# Patient Record
Sex: FEMALE | Race: WHITE | NOT HISPANIC OR LATINO | Employment: UNEMPLOYED | URBAN - METROPOLITAN AREA
[De-identification: names, ages, dates, MRNs, and addresses within clinical notes are randomized per-mention and may not be internally consistent; named-entity substitution may affect disease eponyms.]

---

## 2024-08-06 ENCOUNTER — TELEPHONE (OUTPATIENT)
Dept: BEHAVIORAL HEALTH | Facility: CLINIC | Age: 39
End: 2024-08-06

## 2024-08-06 ENCOUNTER — HOSPITAL ENCOUNTER (EMERGENCY)
Facility: OTHER | Age: 39
Discharge: PSYCHIATRIC OR SUBSTANCE USE FACILITY | End: 2024-08-07
Payer: COMMERCIAL

## 2024-08-06 DIAGNOSIS — F22 DELUSIONS (H): ICD-10-CM

## 2024-08-06 DIAGNOSIS — F41.9 ANXIETY: ICD-10-CM

## 2024-08-06 DIAGNOSIS — F22 PARANOID (H): ICD-10-CM

## 2024-08-06 DIAGNOSIS — R45.851 SUICIDAL IDEATION: ICD-10-CM

## 2024-08-06 PROBLEM — F25.1 SCHIZOAFFECTIVE DISORDER, DEPRESSIVE TYPE (H): Status: ACTIVE | Noted: 2024-08-06

## 2024-08-06 PROBLEM — F43.10 PTSD (POST-TRAUMATIC STRESS DISORDER): Status: ACTIVE | Noted: 2024-08-06

## 2024-08-06 PROBLEM — F41.1 GAD (GENERALIZED ANXIETY DISORDER): Status: ACTIVE | Noted: 2024-08-06

## 2024-08-06 LAB
ALBUMIN UR-MCNC: NEGATIVE MG/DL
AMPHETAMINES UR QL SCN: ABNORMAL
ANION GAP SERPL CALCULATED.3IONS-SCNC: 9 MMOL/L (ref 7–15)
APAP SERPL-MCNC: <5 UG/ML (ref 10–30)
APPEARANCE UR: CLEAR
BARBITURATES UR QL SCN: ABNORMAL
BASOPHILS # BLD AUTO: 0 10E3/UL (ref 0–0.2)
BASOPHILS NFR BLD AUTO: 0 %
BENZODIAZ UR QL SCN: ABNORMAL
BILIRUB UR QL STRIP: NEGATIVE
BUN SERPL-MCNC: 12 MG/DL (ref 6–20)
BZE UR QL SCN: ABNORMAL
CALCIUM SERPL-MCNC: 10.8 MG/DL (ref 8.8–10.4)
CANNABINOIDS UR QL SCN: ABNORMAL
CHLORIDE SERPL-SCNC: 107 MMOL/L (ref 98–107)
COLOR UR AUTO: YELLOW
CREAT SERPL-MCNC: 0.74 MG/DL (ref 0.51–0.95)
EGFRCR SERPLBLD CKD-EPI 2021: >90 ML/MIN/1.73M2
EOSINOPHIL # BLD AUTO: 0.1 10E3/UL (ref 0–0.7)
EOSINOPHIL NFR BLD AUTO: 1 %
ERYTHROCYTE [DISTWIDTH] IN BLOOD BY AUTOMATED COUNT: 12.3 % (ref 10–15)
ETHANOL SERPL-MCNC: <0.01 G/DL
FENTANYL UR QL: ABNORMAL
GLUCOSE SERPL-MCNC: 94 MG/DL (ref 70–99)
GLUCOSE UR STRIP-MCNC: NEGATIVE MG/DL
HCG UR QL: NEGATIVE
HCO3 SERPL-SCNC: 27 MMOL/L (ref 22–29)
HCT VFR BLD AUTO: 44.7 % (ref 35–47)
HGB BLD-MCNC: 14.9 G/DL (ref 11.7–15.7)
HGB UR QL STRIP: NEGATIVE
HOLD SPECIMEN: NORMAL
HOLD SPECIMEN: NORMAL
IMM GRANULOCYTES # BLD: 0 10E3/UL
IMM GRANULOCYTES NFR BLD: 0 %
KETONES UR STRIP-MCNC: NEGATIVE MG/DL
LEUKOCYTE ESTERASE UR QL STRIP: NEGATIVE
LYMPHOCYTES # BLD AUTO: 1.2 10E3/UL (ref 0.8–5.3)
LYMPHOCYTES NFR BLD AUTO: 13 %
MCH RBC QN AUTO: 30.2 PG (ref 26.5–33)
MCHC RBC AUTO-ENTMCNC: 33.3 G/DL (ref 31.5–36.5)
MCV RBC AUTO: 91 FL (ref 78–100)
MONOCYTES # BLD AUTO: 0.5 10E3/UL (ref 0–1.3)
MONOCYTES NFR BLD AUTO: 5 %
MUCOUS THREADS #/AREA URNS LPF: PRESENT /LPF
NEUTROPHILS # BLD AUTO: 7.4 10E3/UL (ref 1.6–8.3)
NEUTROPHILS NFR BLD AUTO: 81 %
NITRATE UR QL: NEGATIVE
NRBC # BLD AUTO: 0 10E3/UL
NRBC BLD AUTO-RTO: 0 /100
OPIATES UR QL SCN: ABNORMAL
PCP QUAL URINE (ROCHE): ABNORMAL
PH UR STRIP: 7.5 [PH] (ref 5–9)
PLATELET # BLD AUTO: 185 10E3/UL (ref 150–450)
POTASSIUM SERPL-SCNC: 4.3 MMOL/L (ref 3.4–5.3)
RBC # BLD AUTO: 4.94 10E6/UL (ref 3.8–5.2)
RBC URINE: 1 /HPF
SALICYLATES SERPL-MCNC: <0.3 MG/DL
SARS-COV-2 RNA RESP QL NAA+PROBE: NEGATIVE
SODIUM SERPL-SCNC: 143 MMOL/L (ref 135–145)
SP GR UR STRIP: 1.01 (ref 1–1.03)
TSH SERPL DL<=0.005 MIU/L-ACNC: 3.19 UIU/ML (ref 0.3–4.2)
UROBILINOGEN UR STRIP-MCNC: NORMAL MG/DL
WBC # BLD AUTO: 9.2 10E3/UL (ref 4–11)
WBC URINE: 1 /HPF

## 2024-08-06 PROCEDURE — 80143 DRUG ASSAY ACETAMINOPHEN: CPT

## 2024-08-06 PROCEDURE — 80179 DRUG ASSAY SALICYLATE: CPT

## 2024-08-06 PROCEDURE — 99285 EMERGENCY DEPT VISIT HI MDM: CPT

## 2024-08-06 PROCEDURE — 36415 COLL VENOUS BLD VENIPUNCTURE: CPT

## 2024-08-06 PROCEDURE — 80307 DRUG TEST PRSMV CHEM ANLYZR: CPT

## 2024-08-06 PROCEDURE — 82077 ASSAY SPEC XCP UR&BREATH IA: CPT

## 2024-08-06 PROCEDURE — 80048 BASIC METABOLIC PNL TOTAL CA: CPT

## 2024-08-06 PROCEDURE — 81025 URINE PREGNANCY TEST: CPT

## 2024-08-06 PROCEDURE — 85025 COMPLETE CBC W/AUTO DIFF WBC: CPT

## 2024-08-06 PROCEDURE — 81001 URINALYSIS AUTO W/SCOPE: CPT

## 2024-08-06 PROCEDURE — 250N000013 HC RX MED GY IP 250 OP 250 PS 637

## 2024-08-06 PROCEDURE — 87635 SARS-COV-2 COVID-19 AMP PRB: CPT

## 2024-08-06 PROCEDURE — 99244 OFF/OP CNSLTJ NEW/EST MOD 40: CPT

## 2024-08-06 PROCEDURE — 84443 ASSAY THYROID STIM HORMONE: CPT

## 2024-08-06 RX ORDER — ACETAMINOPHEN 500 MG
1000 TABLET ORAL ONCE
Status: COMPLETED | OUTPATIENT
Start: 2024-08-06 | End: 2024-08-06

## 2024-08-06 RX ORDER — IBUPROFEN 400 MG/1
800 TABLET, FILM COATED ORAL ONCE
Status: COMPLETED | OUTPATIENT
Start: 2024-08-06 | End: 2024-08-06

## 2024-08-06 RX ORDER — BUSPIRONE HYDROCHLORIDE 10 MG/1
10 TABLET ORAL DAILY
COMMUNITY

## 2024-08-06 RX ADMIN — ACETAMINOPHEN 1000 MG: 500 TABLET, FILM COATED ORAL at 19:31

## 2024-08-06 RX ADMIN — IBUPROFEN 800 MG: 400 TABLET, FILM COATED ORAL at 15:33

## 2024-08-06 ASSESSMENT — ACTIVITIES OF DAILY LIVING (ADL)
ADLS_ACUITY_SCORE: 35

## 2024-08-06 ASSESSMENT — ENCOUNTER SYMPTOMS: NERVOUS/ANXIOUS: 1

## 2024-08-06 ASSESSMENT — COLUMBIA-SUICIDE SEVERITY RATING SCALE - C-SSRS
3. HAVE YOU BEEN THINKING ABOUT HOW YOU MIGHT KILL YOURSELF?: NO
5. HAVE YOU STARTED TO WORK OUT OR WORKED OUT THE DETAILS OF HOW TO KILL YOURSELF? DO YOU INTEND TO CARRY OUT THIS PLAN?: NO
2. HAVE YOU ACTUALLY HAD ANY THOUGHTS OF KILLING YOURSELF IN THE PAST MONTH?: YES
1. IN THE PAST MONTH, HAVE YOU WISHED YOU WERE DEAD OR WISHED YOU COULD GO TO SLEEP AND NOT WAKE UP?: YES
4. HAVE YOU HAD THESE THOUGHTS AND HAD SOME INTENTION OF ACTING ON THEM?: YES
6. HAVE YOU EVER DONE ANYTHING, STARTED TO DO ANYTHING, OR PREPARED TO DO ANYTHING TO END YOUR LIFE?: NO

## 2024-08-06 NOTE — ED NOTES
Placed call to the Crisis Response Team (CRT) at [phone]. Spoke with Mitchell and provided clinical information. CRT will follow up upon pt discharging to the community by phone or in person, as appropriate. Faxed assessment to CRT at [phone].
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
## 2024-08-06 NOTE — ED NOTES
IP MH Referral Acuity Rating Score (RARS)    LMHP complete at referral to IP MH, with DEC; and, daily while awaiting IP MH placement. Call score to PPS.  CRITERIA SCORING   New 72 HH and Involuntary for IP MH (not adolescent) 1/1   Boarding over 24 hours 0/1   Vulnerable adult at least 55+ with multiple co morbidities; or, Patient age 11 or under 0/1   Suicide ideation without relief of precipitating factors 1/1   Current plan for suicide 0/1   Current plan for homicide 0/1   Imminent risk or actual attempt to seriously harm another without relief of factors precipitating the attempt 0/1   Severe dysfunction in daily living (ex: complete neglect for self care, extreme disruption in vegetative function, extreme deterioration in social interactions) 1/1   Recent (last 2 weeks) or current physical aggression in the ED 0/1   Restraints or seclusion episode in ED 0/1   Verbal aggression, agitation, yelling, etc., while in the ED 0/1   Active psychosis with psychomotor agitation or catatonia 0/1   Need for constant or near constant redirection (from leaving, from others, etc).  0/1   Intrusive or disruptive behaviors 1/1   TOTAL Acuity Total Score: 4
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
## 2024-08-06 NOTE — PHARMACY-ADMISSION MEDICATION HISTORY
Pharmacist Admission Medication History    Admission medication history is complete. The information provided in this note is only as accurate as the sources available at the time of the update.    Information Source(s): Patient and [facility]  via in-person and phone    Pertinent Information: Patient fairly knowledgeable about her medication. Patient stated that she is taking only two prescription medication. Patient was able to give strength, frequency, last dose for Prozac, but was unable to given the strength for her Buspar. [facility] was called and strength was verified.   Patient stated that she started these medications only a week ago. Per Casandraeens, this seems to match up: Medication were picked up on 7/25/24.   Patient stated that she is not taking any additional OTC, herbal supplements, or vitamins.    Changes made to PTA medication list:  Added:   FLUoxetine   busPIRone   Deleted: None  Changed: None    Allergies reviewed with patient and updates made in EHR: yes    Medication History Completed By: Joe Harmon RPH 8/6/2024 3:49 PM    PTA Med List   Medication Sig Last Dose    busPIRone (BUSPAR) 10 MG tablet Take 10 mg by mouth daily 8/5/2024 at Afternoon    FLUoxetine (PROZAC) 20 MG capsule Take 20 mg by mouth daily 8/5/2024 at Afternoon
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
## 2024-08-07 ENCOUNTER — TELEPHONE (OUTPATIENT)
Dept: BEHAVIORAL HEALTH | Facility: CLINIC | Age: 39
End: 2024-08-07
Payer: COMMERCIAL

## 2024-08-07 VITALS
TEMPERATURE: 98 F | WEIGHT: 174 LBS | DIASTOLIC BLOOD PRESSURE: 89 MMHG | HEART RATE: 50 BPM | OXYGEN SATURATION: 97 % | RESPIRATION RATE: 16 BRPM | BODY MASS INDEX: 28.99 KG/M2 | SYSTOLIC BLOOD PRESSURE: 129 MMHG | HEIGHT: 65 IN

## 2024-08-07 PROCEDURE — 250N000013 HC RX MED GY IP 250 OP 250 PS 637: Performed by: STUDENT IN AN ORGANIZED HEALTH CARE EDUCATION/TRAINING PROGRAM

## 2024-08-07 RX ORDER — BUSPIRONE HYDROCHLORIDE 10 MG/1
10 TABLET ORAL DAILY
Status: DISCONTINUED | OUTPATIENT
Start: 2024-08-07 | End: 2024-08-07 | Stop reason: HOSPADM

## 2024-08-07 RX ADMIN — BUSPIRONE HYDROCHLORIDE 10 MG: 10 TABLET ORAL at 09:06

## 2024-08-07 RX ADMIN — FLUOXETINE HYDROCHLORIDE 20 MG: 20 CAPSULE ORAL at 09:06

## 2024-08-07 ASSESSMENT — ACTIVITIES OF DAILY LIVING (ADL)
ADLS_ACUITY_SCORE: 35

## 2024-08-07 NOTE — ED PROVIDER NOTES
Transfer of care from previous shift provider:. SI with psychosis. Transferring to [facility] at noon today.    Assessment and Plan:  Final diagnoses:   Paranoid (H)   Suicidal ideation   Anxiety   Delusions (H)      Disposition: [facility] behavioral health      Felix Davis MD  08/07/24 2030

## 2024-08-07 NOTE — CONSULTS
"[facility] AND [facility] PSYCHIATRY   CONSULT     ASSESSMENT & PLAN     This is a 39 year old female with a PMH of schizoaffective disorder, PTSD, substance use disorder seen for psychiatric consultation. Placed on 72-hour hold on 8/6/2024 and recommended for inpatient hospitalization r/t acute psychosis. Prior to face-to-face consult, patient accepted for placement under Dr. Coleman at [facility]. No further consult necessary.     Working Diagnoses:    Psychotic disorder, unspecified    PTA medications:    Fluoxetine 20 mg (patient reported)  BuSpar 10 mg (patient reported)  Pristiq 50 mg ??    New medications initiated:     NA    Therapeutic Modalities: arts/crafts, interaction with pets, meditation, music, social media/computer activities, television/movies/videos, exercise/fitness, reading, writing/journaling/blogging. Education will be provided on diagnoses, medications, and treatments.     Medical diagnoses:  Per medicine.    Labs: Per medicine.        HISTORY OF PRESENT ILLNESS     Brenda Adams is a 39 year old female reviewed for consultation at the request of CURTIS Hoffman, for psychiatric consultation. The patient was reviewed for evaluation of paranoia, anxiety.  Sources of information include DEC assessment, chart review.    Location: [facility]  History of present illness/hospital course:     Per DEC:  \"Pt is a 39 year old  female with history of schizoaffective disorder, bipolar type, PTSD, anxiety and NIRAJ. Pt was brought to the ER today by EMS due to worsening of paranoia, delusion, anxiety and suicidal ideations. Pt remarked, \"Apparently, I have mental health issues, I have anxiety and depression.\" as her reason for visiting the ER today. Pt shared onset of suicidal ideations when she was 10 years old and her mental health symptoms got worse when her mom passed away in 2021. Pt said she did not know exactly who called the police on her but she thought her neighbors called the " "police and the police called EMS. Pt stated, \"I woke up this morning, then something told me to check out in the hallway and I heard my neighbors talking about me as they were trying to get me into trouble to get me evicted from my apartment.\" Pt said she can hear what other people were thinking but denied having commanding auditory hallucination. Pt also denied having visual hallucination. Pt endorsed paranoia as she felt her sister and neighbors were watching her, following her and ought to harm her. Pt endorsed delusional thinking as she believed her sister has been calling her neighbors around to tell them to harrass her by going in and out of her apartment to move her things around. Pt reported her neighbors were calling her names, \"Cuckoo and Wacko.\" Pt also alleged that her neighbors were putting dog poop on her apartment door to harrass her. Pt noted her sister has been stalking her, monitoring her phone, harrassing her through the 3rd party and she worked with law enforcement. Pt said her sister harrassed her in the past when she lived in [place] as this was not the first time. Pt endorsed increased depression, cry, worry, isolation, racing thoughts and anxiety. Pt reported she mostly worried about her dogs, other neighbors and people. Pt reported having poor sleep with less appetite. Pt currently denied having suicidal ideations but reported having thoughts of suicide earlier today without specific plan. Pt denied having homicidal ideations and access to firearms. Pt denied history of SIB, however Pt reported previous suicide attempts in 2016 and 2013.\"     Current Psychiatric Medications:   Current Outpatient Medications (Includes Only Antianxiety agents, Antidepressants, Antipsychotics, Misc. psychotherapeutic)   Medication Sig    FLUoxetine (PROZAC) 20 MG capsule Take 20 mg by mouth daily    busPIRone (BUSPAR) 10 MG tablet Take 10 mg by mouth daily       Current Oupatient Psychiatrist: [facility]    Current Outpatient Therapist: None    Past medications:  Effexor  Pristiq  Risperidone (2022)  Lexapro  Hydroxyzine  Xanax  Quetiapine (2022)  Ativan - reported as \"making me psychotic\"  Sertraline       MENTAL STATUS EXAM/PSYCHIATRIC ASSESSMENT     Deferred - patient accepted to [facility] under Dr. Coleman prior to face-to-face assessment.        PSYCHIATRIC HISTORY/ROS     Refer to DEC assessment for more thorough psychiatric history.       PAST MEDICAL HISTORY     Past Medical History:    Schizoaffective disorder, depressive type (HCC)  Type 2 diabetes mellitus without complication, without long-term current use of insulin (HCC)   Tobacco use disorder   Mixed hyperlipidemia   Methamphetamine use disorder, severe, in sustained remission (HCC)   Endometrial cancer (HCC)   Involuntary movements   Need for prophylactic vaccination against Streptococcus pneumoniae (pneumococcus)   Chronic midline low back pain without sciatica   Other acute pulmonary embolism without acute cor pulmonale (HCC)     Surgical History:     Hysterectomy, pilonidal cyst removal    Family History:  Positive for completed suicide (2 brothers).    Social History:  Mom passed in September 2021, history of homelessness. No previous  involvement. Previous legal issues including DUI, DWI - currently on probation for felony property damage. Has completed multiple CD treatments.    Allergies: No Known Allergies       PHYSICAL EXAM/ROS     Most recent physical exam as documented in the medical record has been reviewed.     Pertinent Labs:  Recent Results (from the past 24 hour(s))   Basic metabolic panel    Collection Time: 08/06/24  3:10 PM   Result Value Ref Range    Sodium 143 135 - 145 mmol/L    Potassium 4.3 3.4 - 5.3 mmol/L    Chloride 107 98 - 107 mmol/L    Carbon Dioxide (CO2) 27 22 - 29 mmol/L    Anion Gap 9 7 - 15 mmol/L    Urea Nitrogen 12.0 6.0 - 20.0 mg/dL    Creatinine 0.74 0.51 - 0.95 mg/dL    GFR " Estimate >90 >60 mL/min/1.73m2    Calcium 10.8 (H) 8.8 - 10.4 mg/dL    Glucose 94 70 - 99 mg/dL   TSH Reflex GH    Collection Time: 08/06/24  3:10 PM   Result Value Ref Range    TSH 3.19 0.30 - 4.20 uIU/mL   Salicylate level    Collection Time: 08/06/24  3:10 PM   Result Value Ref Range    Salicylate <0.3   mg/dL   Acetaminophen GH    Collection Time: 08/06/24  3:10 PM   Result Value Ref Range    Acetaminophen <5.0 (L) 10.0 - 30.0 ug/mL   CBC with platelets and differential    Collection Time: 08/06/24  3:10 PM   Result Value Ref Range    WBC Count 9.2 4.0 - 11.0 10e3/uL    RBC Count 4.94 3.80 - 5.20 10e6/uL    Hemoglobin 14.9 11.7 - 15.7 g/dL    Hematocrit 44.7 35.0 - 47.0 %    MCV 91 78 - 100 fL    MCH 30.2 26.5 - 33.0 pg    MCHC 33.3 31.5 - 36.5 g/dL    RDW 12.3 10.0 - 15.0 %    Platelet Count 185 150 - 450 10e3/uL    % Neutrophils 81 %    % Lymphocytes 13 %    % Monocytes 5 %    % Eosinophils 1 %    % Basophils 0 %    % Immature Granulocytes 0 %    NRBCs per 100 WBC 0 <1 /100    Absolute Neutrophils 7.4 1.6 - 8.3 10e3/uL    Absolute Lymphocytes 1.2 0.8 - 5.3 10e3/uL    Absolute Monocytes 0.5 0.0 - 1.3 10e3/uL    Absolute Eosinophils 0.1 0.0 - 0.7 10e3/uL    Absolute Basophils 0.0 0.0 - 0.2 10e3/uL    Absolute Immature Granulocytes 0.0 <=0.4 10e3/uL    Absolute NRBCs 0.0 10e3/uL   Extra Blue Top Tube    Collection Time: 08/06/24  3:10 PM   Result Value Ref Range    Hold Specimen x    Extra Green Top (Lithium Heparin) Tube    Collection Time: 08/06/24  3:10 PM   Result Value Ref Range    Hold Specimen x    Ethanol GH    Collection Time: 08/06/24  3:10 PM   Result Value Ref Range    Alcohol ethyl <0.01 <=0.01 g/dL   Asymptomatic COVID-19 Virus (Coronavirus) by PCR Nasopharyngeal    Collection Time: 08/06/24  3:23 PM    Specimen: Nasopharyngeal; Swab   Result Value Ref Range    SARS CoV2 PCR Negative Negative   HCG qualitative urine (UPT)    Collection Time: 08/06/24  4:36 PM   Result Value Ref Range    hCG Urine  "Qualitative Negative Negative   UA with Microscopic reflex to Culture    Collection Time: 08/06/24  4:36 PM    Specimen: Urine, Midstream   Result Value Ref Range    Color Urine Yellow Colorless, Straw, Light Yellow, Yellow    Appearance Urine Clear Clear    Glucose Urine Negative Negative mg/dL    Bilirubin Urine Negative Negative    Ketones Urine Negative Negative mg/dL    Specific Gravity Urine 1.008 1.000 - 1.030    Blood Urine Negative Negative    pH Urine 7.5 5.0 - 9.0    Protein Albumin Urine Negative Negative mg/dL    Urobilinogen Urine Normal Normal, 2.0 mg/dL    Nitrite Urine Negative Negative    Leukocyte Esterase Urine Negative Negative    Mucus Urine Present (A) None Seen /LPF    RBC Urine 1 <=2 /HPF    WBC Urine 1 <=5 /HPF   Urine Drug Screen Panel    Collection Time: 08/06/24  4:36 PM   Result Value Ref Range    Amphetamines Urine Screen Negative Screen Negative    Barbituates Urine Screen Negative Screen Negative    Benzodiazepine Urine Screen Negative Screen Negative    Cannabinoids Urine Screen Positive (A) Screen Negative    Cocaine Urine Screen Negative Screen Negative    Fentanyl Qual Urine Screen Negative Screen Negative    Opiates Urine Screen Negative Screen Negative    PCP Urine Screen Negative Screen Negative        Vitals: BP (!) 140/90   Pulse 52   Temp 98  F (36.7  C) (Tympanic)   Resp 16   Ht 1.651 m (5' 5\")   Wt 78.9 kg (174 lb)   SpO2 96%   BMI 28.96 kg/m      Current Medications:  Prior to Admission medications    Medication Sig Start Date End Date Taking? Authorizing Provider   busPIRone (BUSPAR) 10 MG tablet Take 10 mg by mouth daily   Yes Unknown, Entered By History   FLUoxetine (PROZAC) 20 MG capsule Take 20 mg by mouth daily   Yes Unknown, Entered By History          ATTESTATION      Concepción Rick, DAPHNEY, PMHNP-BC    45 minutes spent on the date of the encounter doing chart review, history, documentation and further activities per the note.     "

## 2024-08-07 NOTE — PROGRESS NOTES
"Triage & Transition Services, Extended Care       Patient: Brenda goes by \"Brenda,\" uses she/her pronouns  Date of Service: August 7, 2024  Site of Service: [facility]                             ED06  Patient was seen no Virtual: AmWell  Mode of Assessment: Virtual: AmWell    Patient is followed related to: 72 hour hold, boarding status, high patient acuity  Notable observations from today's encounter include: Writer attempted to meet with patient but she has been accepted to [facility] IP under Dr. Coleman. Pt is pending transfer. Writer will not meet with pt in order to avoid any delays in transportation.  The care team is working towards the following: Learn and Demonstrate at [employer] Focused on Crisis Stabilization  Significant status changes: no  Case Management included: Case Management Included: collaborating with patient's support system  Details on Collaborating with Patient's Support System: Spoke with RN: Mehdi Moran  Summary of Interaction: Per RN: Pending transfer for IP placement.    Recommendations: Final Disposition / Recommended Care Path: inpatient mental health  Plan for Care reviewed with assigned Medical Provider: yes  Plan for Care Team Review: provider  Comments: Dr. Davis  Patient and/or validated legal guardian concurs: no  Clinical Substantiation: Recommendation for inpatient mental health does not change at this time. Pt has been accepted to [facility] pending transfer this AM.    Summary: Recommendation for inpatient mental health does not change at this time. Pt has been accepted to [facility] pending transfer this AM.    Legal Status: [facility]: [facility]  Legal Status at Admission: 72 Hour Hold  72 Hour Hold - Date/Time Initiated: 08/06/24 1500  72 Hour Hold - Date/Time Ends: 08/10/24 1500    SANJUANITA Avelar   Licensed Mental Health Professional (LMHP), Extended Care  [phone]   "

## 2024-08-07 NOTE — TELEPHONE ENCOUNTER
[place]: [place]  Access Inpatient Bed Call Log  8/7/2024  @ 12AM:  Intake has called facilities that have not updated their bed status within the last 12 hours.??      [facility]: @ cap per website.  [facility]:  @Cap per website.  - [phone]-no answer.   [facility]: @ Cap per website.  [facility]: @ Cap per website. - [phone]-at capacity per call.   [facility]: @ Cap per website.  [facility]: Posting 2 beds. [phone]  [facility] @ posting 4 beds. Ages 18-35, Voluntary only, COVID test req'd, NO aggression, physical or sexual assault, violence hx or drug abuse, or psychosis - 7:40 AM Per [facility] 3 child, 9 Adolescent, and 5 YA beds avail.  [phone]-Per call at 4:30pm- [facility] reports they have a couple YA beds.    [facility]: @ Cap per website.  [facility]: @ cap per website.  [place] [facility]:  @ Cap per website.     [facility]: @ Posting 4 beds.  Mixed unit/Low acuity only.  [phone]  [facility]: Posting 2 beds.  Low acuity, No aggression [phone]  [facility]: @ cap per website.  [facility]:  Posting 2 beds. Low acuity only. No current aggression. [phone]  [facility]:  @ Posting 1 bed. COVID negative test req. Lower acuity only. No Aggression.  [facility]: @ cap per website. Low acuity only. Prefer med adjustments placement.          No aggression [phone]  [facility]:  @ Posting 5 beds. COVID negative test req. Lower acuity only. No Aggression.   [place] [facility]: Posting 1 bed. NO reviews after 10PM. Low acuity only.      [facility]: @ Posting 3 bed. No hx of aggression. No sexual offenders. Voluntary patients only [phone]- Pt not appropriate due to 72HH  [facility]:  @ Posting 4 beds. Low acuity only. Must have the cognitive ability to do programming. No aggressive or violent behavior or recent HX in the last 2 yrs. MH must be primary.  [phone]  [facility]: @ Cap per website. COVID negative test. Must be low acuity ONLY.   [facility]: @ Posting 2 beds. Low acuity. Negative Covid. - [phone]  [facility]: @ Posting 3 beds.  Negative COVID test  - Declined due to needing high acuity bed.   [facility]: @ Posting 1 beds. No hx of aggression/assault. No lines, drains or tubes. Does not provide detox or CD treatment.  [phone]  [facility] : @ Posting 4 beds. Mixed unit/Low acuity/no medical devices - IV, CPAP etc. Negative Covid.).  [phone]     Pt remains on waitlist pending appropriate placement availability.    2:09 AM Intake called [facility]. Per Magdalena, they are capped.     2:13 AM Intake called [facility]. Per Janey, the pt is declined due to legal concerns meeting exclusionary criteria.     2:23 AM Intake called [facility]. Per Cassandra, they are able to review.

## 2024-08-07 NOTE — ED PROVIDER NOTES
"  History     Chief Complaint   Patient presents with    Anxiety    Paranoid     HPI  Brenda Adams is a 39 year old female who presented for delusional and paranoid thoughts.  Please see Concepción Carranza's note for further details.  Because of these ongoing issues she is feeling more suicidal.    Allergies:  No Known Allergies    Problem List:    Patient Active Problem List    Diagnosis Date Noted    Schizoaffective disorder, depressive type (H) 08/06/2024     Priority: Medium    OLGA (generalized anxiety disorder) 08/06/2024     Priority: Medium    PTSD (post-traumatic stress disorder) 08/06/2024     Priority: Medium        Past Medical History:    History reviewed. No pertinent past medical history.    Past Surgical History:    History reviewed. No pertinent surgical history.    Family History:    History reviewed. No pertinent family history.    Social History:  Marital Status:  Single [1]  Social History     Tobacco Use    Smoking status: Former     Types: Cigarettes    Smokeless tobacco: Never   Substance Use Topics    Alcohol use: Not Currently    Drug use: Not Currently        Medications:    busPIRone (BUSPAR) 10 MG tablet  FLUoxetine (PROZAC) 20 MG capsule          Review of Systems    Physical Exam   BP: (!) 140/90  Pulse: 52  Temp: 98  F (36.7  C)  Resp: 16  Height: 165.1 cm (5' 5\")  Weight: 78.9 kg (174 lb)  SpO2: 96 %      Physical Exam    ED Course     ED Course as of 08/07/24 0623   Tue Aug 06, 2024   1500 Spoke with Magdaleno OJEDA  who recommends inpatient hospitalization for stabilization.  I agree with DEC recommendation.   1632 Psych (Concepción) will round in AM for patient interview/assessment.   1914 She reports back pain. Tylenol ordered.    2350 Awaiting inpatient bed placement.    Wed Aug 07, 2024   0009 Change of shift hand off given to Dr. Otto.    0010 Sign out at shift change. Came in by [facility] for being paranoid/delusional. Plan for inpatient mental health.     Procedures      "         Critical Care time:  none               Results for orders placed or performed during the hospital encounter of 08/06/24 (from the past 24 hour(s))   CBC with platelets differential    Narrative    The following orders were created for panel order CBC with platelets differential.  Procedure                               Abnormality         Status                     ---------                               -----------         ------                     CBC with platelets and d...[[id number]]                      Final result                 Please view results for these tests on the individual orders.   Basic metabolic panel   Result Value Ref Range    Sodium 143 135 - 145 mmol/L    Potassium 4.3 3.4 - 5.3 mmol/L    Chloride 107 98 - 107 mmol/L    Carbon Dioxide (CO2) 27 22 - 29 mmol/L    Anion Gap 9 7 - 15 mmol/L    Urea Nitrogen 12.0 6.0 - 20.0 mg/dL    Creatinine 0.74 0.51 - 0.95 mg/dL    GFR Estimate >90 >60 mL/min/1.73m2    Calcium 10.8 (H) 8.8 - 10.4 mg/dL    Glucose 94 70 - 99 mg/dL   TSH Reflex GH   Result Value Ref Range    TSH 3.19 0.30 - 4.20 uIU/mL   Salicylate level   Result Value Ref Range    Salicylate <0.3   mg/dL   Acetaminophen GH   Result Value Ref Range    Acetaminophen <5.0 (L) 10.0 - 30.0 ug/mL   CBC with platelets and differential   Result Value Ref Range    WBC Count 9.2 4.0 - 11.0 10e3/uL    RBC Count 4.94 3.80 - 5.20 10e6/uL    Hemoglobin 14.9 11.7 - 15.7 g/dL    Hematocrit 44.7 35.0 - 47.0 %    MCV 91 78 - 100 fL    MCH 30.2 26.5 - 33.0 pg    MCHC 33.3 31.5 - 36.5 g/dL    RDW 12.3 10.0 - 15.0 %    Platelet Count 185 150 - 450 10e3/uL    % Neutrophils 81 %    % Lymphocytes 13 %    % Monocytes 5 %    % Eosinophils 1 %    % Basophils 0 %    % Immature Granulocytes 0 %    NRBCs per 100 WBC 0 <1 /100    Absolute Neutrophils 7.4 1.6 - 8.3 10e3/uL    Absolute Lymphocytes 1.2 0.8 - 5.3 10e3/uL    Absolute Monocytes 0.5 0.0 - 1.3 10e3/uL    Absolute Eosinophils 0.1 0.0 - 0.7 10e3/uL     Absolute Basophils 0.0 0.0 - 0.2 10e3/uL    Absolute Immature Granulocytes 0.0 <=0.4 10e3/uL    Absolute NRBCs 0.0 10e3/uL   Extra Tube    Narrative    The following orders were created for panel order Extra Tube.  Procedure                               Abnormality         Status                     ---------                               -----------         ------                     Extra Blue Top Tube[[id number]]                              Final result               Extra Green Top (Lithium...[[id number]]                      Final result                 Please view results for these tests on the individual orders.   Extra Blue Top Tube   Result Value Ref Range    Hold Specimen x    Extra Green Top (Lithium Heparin) Tube   Result Value Ref Range    Hold Specimen x    Ethanol GH   Result Value Ref Range    Alcohol ethyl <0.01 <=0.01 g/dL   Asymptomatic COVID-19 Virus (Coronavirus) by PCR Nasopharyngeal    Specimen: Nasopharyngeal; Swab   Result Value Ref Range    SARS CoV2 PCR Negative Negative    Narrative    Testing was performed using the Xpert Xpress SARS-CoV-2 Assay on the Cepheid Gene-Xpert Instrument Systems. Additional information about this Emergency Use Authorization (EUA) assay can be found via the Lab Guide. This test should be ordered for the detection of SARS-CoV-2 in individuals who meet SARS-CoV-2 clinical and/or epidemiological criteria as well as from individuals without symptoms or other reasons to suspect COVID-19. Test performance for asymptomatic patients has only been established in anterior nasal swab specimens. This test is for in vitro diagnostic use under the FDA EUA for laboratories certified under CLIA to perform high complexity testing. This test has not been FDA cleared or approved. A negative result does not rule out the presence of PCR inhibitors in the specimen or target RNA concentration below the limit of detection for the assay. The possibility of a false negative should be  considered if the patient's recent exposure or clinical presentation suggests COVID-19. This test was validated by [facility]. This laboratory is certified under the Clinical Laboratory Improvement Amendments (CLIA) as qualified to perform high complexity clinical laboratory testing.   HCG qualitative urine (UPT)   Result Value Ref Range    hCG Urine Qualitative Negative Negative   UA with Microscopic reflex to Culture    Specimen: Urine, Midstream   Result Value Ref Range    Color Urine Yellow Colorless, Straw, Light Yellow, Yellow    Appearance Urine Clear Clear    Glucose Urine Negative Negative mg/dL    Bilirubin Urine Negative Negative    Ketones Urine Negative Negative mg/dL    Specific Gravity Urine 1.008 1.000 - 1.030    Blood Urine Negative Negative    pH Urine 7.5 5.0 - 9.0    Protein Albumin Urine Negative Negative mg/dL    Urobilinogen Urine Normal Normal, 2.0 mg/dL    Nitrite Urine Negative Negative    Leukocyte Esterase Urine Negative Negative    Mucus Urine Present (A) None Seen /LPF    RBC Urine 1 <=2 /HPF    WBC Urine 1 <=5 /HPF    Narrative    Urine Culture not indicated   Urine Drug Screen    Narrative    The following orders were created for panel order Urine Drug Screen.  Procedure                               Abnormality         Status                     ---------                               -----------         ------                     Urine Drug Screen Panel[[id number]]      Abnormal            Final result                 Please view results for these tests on the individual orders.   Urine Drug Screen Panel   Result Value Ref Range    Amphetamines Urine Screen Negative Screen Negative    Barbituates Urine Screen Negative Screen Negative    Benzodiazepine Urine Screen Negative Screen Negative    Cannabinoids Urine Screen Positive (A) Screen Negative    Cocaine Urine Screen Negative Screen Negative    Fentanyl Qual Urine Screen Negative  Screen Negative    Opiates Urine Screen Negative Screen Negative    PCP Urine Screen Negative Screen Negative       Medications   ibuprofen (ADVIL/MOTRIN) tablet 800 mg (800 mg Oral $Given 8/6/24 1533)   acetaminophen (TYLENOL) tablet 1,000 mg (1,000 mg Oral $Given 8/6/24 1931)       Assessments & Plan (with Medical Decision Making)     I have reviewed the nursing notes.    I have reviewed the findings, diagnosis, plan and need for follow up with the patient.           Medical Decision Making  The patient's presentation was of high complexity (a chronic illness severe exacerbation, progression, or side effect of treatment).    The patient's evaluation involved:  history and exam without other MDM data elements    The patient's management necessitated high risk (a decision regarding hospitalization).        New Prescriptions    No medications on file       Final diagnoses:   Paranoid (H)   Suicidal ideation   Anxiety   Delusions (H)     Plan for transfer to higher level of care inpatient mental [facility]. Dr. Coleman.       8/6/2024   [facility] AND [facility]       Elissa Otto,   08/07/24 0623       Elissa Otto,   08/07/24 0624

## 2024-08-07 NOTE — TELEPHONE ENCOUNTER
7:34 AM: [facility] confirmed that Pt has been accepted for placement under Dr. Coleman. Number for RN report is [phone].

## 2024-08-07 NOTE — TELEPHONE ENCOUNTER
R: [facility]patient Bed Call Log 8/6/2024 6:51 PM   Intake has called facilities that have not updated their bed status within the last 12 hours.??        [facility]   [place]-- [facility]: @ cap per website.   [place]-- [facility]:  Posting 0 beds. [phone], APS:[phone] ECT   [place]-- [facility]: Posting 0 beds. ECT Tx offered.        [place]-- [facility]:  Posting 0 beds.   [facility]: Posting 2 beds. [phone] ECT Tx offered.   [place] -- [facility]: Posting 6 beds. Pt not appropriate.  [place] -- [facility] Posting 0 beds. [phone]     [place] -- [facility]:  Posting 0 beds. Low acuity only. [phone]  [facility]: Posting 4 beds. Mixed unit/Low acuity only.  [phone]   : Posting 0 beds Low acuity, No aggression.    [facility]: @ cap per website ECT Tx offered [phone]   [facility]:  Posting 3 beds. COVID negative test req. Lower acuity only [phone]  [facility]:  Posting 1 bed. Low acuity only. No current aggression.   [facility]: Posting 0 beds. Low acuity. ECT Tx offered @ [place] site ONLY   [phone]   [facility]:  Posting 1 bed. NO reviews after 10PM. Low acuity only.    [facility]: Posting 5 beds. Low acuity only.   [facility] [place]: Posting 3 beds. No hx of aggression, sexual offense. [phone]     [facility]:  Posting 4 beds. Low acuity only. Must have the cognitive ability to do programming. No aggressive or violent behavior or recent HX. [phone]   Emir Bustamante MD, [place]: Posting 0 beds. COVID negative test. Must be low acuity ONLY. [phone]    [facility]: Posting 2 beds. Low acuity. Negative Covid. [phone].   [facility]: Posting 0 beds. COVID negative test. [phone]   [facility]: Posting 1 bed. No hx of aggression/assault. No lines,  drains or tubes. Must have a ride home. [phone]   [facility], [facility]: Posting 5 beds. Mixed unit/Low acuity/no medical devices - Negative Covid. [phone].  [facility] [place] 20 [facility]. Out-of-State Facility. [phone]     Pt remains on worklist pending appropriate bed availability.

## 2024-08-07 NOTE — ED NOTES
Pt has been accepted to [facility] by Dr. Coleman  Called protective transport to see if they have any drivers available.   They will attempt to secure  and call back.  Deidre Granger RN on 8/7/2024 at 6:31 AM

## 2024-08-07 NOTE — ED NOTES
Pt changed into paper scrubs without incident.     Pt's belongings including clothes, shoes, watch and purse placed in lockup  Deidre Granger RN on 8/6/2024 at 8:08 PM

## (undated) RX ORDER — ACETAMINOPHEN 500 MG
TABLET ORAL
Status: DISPENSED
Start: 2024-08-06

## (undated) RX ORDER — IBUPROFEN 400 MG/1
TABLET, FILM COATED ORAL
Status: DISPENSED
Start: 2024-08-06